# Patient Record
Sex: MALE | Race: WHITE | NOT HISPANIC OR LATINO | URBAN - METROPOLITAN AREA
[De-identification: names, ages, dates, MRNs, and addresses within clinical notes are randomized per-mention and may not be internally consistent; named-entity substitution may affect disease eponyms.]

---
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
## 2025-01-29 ENCOUNTER — TELEPHONE (OUTPATIENT)
Dept: OTHER | Age: 20
End: 2025-01-29
Payer: COMMERCIAL

## 2025-01-29 DIAGNOSIS — F41.9 ANXIETY DISORDER, UNSPECIFIED TYPE: ICD-10-CM

## 2025-01-29 RX ORDER — ESCITALOPRAM OXALATE 10 MG/1
10 TABLET ORAL EVERY MORNING
Qty: 30 TABLET | Refills: 0 | Status: SHIPPED | OUTPATIENT
Start: 2025-01-29 | End: 2025-02-28

## 2025-01-29 NOTE — TELEPHONE ENCOUNTER
Caller: pt's dadJohn    Medication:  Escitalopram 10 mgs    Pharmacy:  [place] [phone]    Next visit: 2.21.25

## 2025-02-21 ENCOUNTER — TELEPHONE (OUTPATIENT)
Dept: BEHAVIORAL HEALTH | Facility: CLINIC | Age: 20
End: 2025-02-21

## 2025-02-21 ENCOUNTER — APPOINTMENT (OUTPATIENT)
Dept: BEHAVIORAL HEALTH | Facility: CLINIC | Age: 20
End: 2025-02-21
Payer: COMMERCIAL

## 2025-02-21 DIAGNOSIS — F90.2 ADHD (ATTENTION DEFICIT HYPERACTIVITY DISORDER), COMBINED TYPE: Primary | ICD-10-CM

## 2025-02-21 DIAGNOSIS — F41.9 ANXIETY DISORDER, UNSPECIFIED TYPE: ICD-10-CM

## 2025-02-21 PROCEDURE — 99213 OFFICE O/P EST LOW 20 MIN: CPT | Performed by: PSYCHIATRY & NEUROLOGY

## 2025-02-21 RX ORDER — SERDEXMETHYLPHENIDATE AND DEXMETHYLPHENIDATE 5.2; 26.1 MG/1; MG/1
1 CAPSULE ORAL DAILY
Qty: 30 CAPSULE | Refills: 0 | Status: SHIPPED | OUTPATIENT
Start: 2025-02-21 | End: 2025-03-23

## 2025-02-21 RX ORDER — SERDEXMETHYLPHENIDATE AND DEXMETHYLPHENIDATE 5.2; 26.1 MG/1; MG/1
1 CAPSULE ORAL DAILY
Qty: 30 CAPSULE | Refills: 0 | Status: SHIPPED | OUTPATIENT
Start: 2025-02-21 | End: 2025-02-21

## 2025-02-21 RX ORDER — ESCITALOPRAM OXALATE 10 MG/1
10 TABLET ORAL EVERY MORNING
Qty: 90 TABLET | Refills: 1 | Status: SHIPPED | OUTPATIENT
Start: 2025-02-21 | End: 2025-08-20

## 2025-02-21 NOTE — PROGRESS NOTES
Virtual appointment with patient.  Consent obtained for this platform and identification verified.  He was located in [place] where he attends college.      He states freshman year at [place] is going well.  Earning A and B grades.  Socially doing well.      He has been taking Escitalopram 10 mg consistently which he states helps with mood/anxiety. He denies adverse effects.    He denies any sustained sadness or heightened anxiety.   He denies suicidal or homicidal ideation  No aggression or self-harm  He denies physical complaints.     After last appointment 8/2024 he started Azstarys 26.1-5.2 mg which he reports helped with ADHD more than other medications he has used. He filled second Rx in November, then most recently he filled Vyvanse 20 mg which was on file at pharmacy from past trial.  He reviewed Azstarys works the best for ADHD    He reports infrequent cannabis use, aware of risks    MSE:  Normal dress and grooming. Thought process goal-directed.  Speech normal tone, rate, quality.  Euthymic mood, full range of affect.  Denies suicidal or homicidal ideation.  No agitation.  Alert and oriented X 4.   Judgment and insight fair    Dx:  ADHD;  Anxiety Disorder    Plan:    Resume Azstarys 26.1-5.2 mg, one every morning.  Consent obtained.  Rx for #30 sent to [place] pharmacy    OARRS reviewed    Continue Escitalopram 10 mg every morning.  Ongoing consent obtained.  Rx for 10 mg #90 with one refill sent to [place] pharmacy    Follow-up in 3 months, call as needed in the interim

## 2025-02-23 ENCOUNTER — OFFICE VISIT (OUTPATIENT)
Dept: URGENT CARE | Age: 20
End: 2025-02-23
Payer: COMMERCIAL

## 2025-02-23 VITALS
OXYGEN SATURATION: 98 % | SYSTOLIC BLOOD PRESSURE: 122 MMHG | DIASTOLIC BLOOD PRESSURE: 68 MMHG | RESPIRATION RATE: 16 BRPM | HEART RATE: 110 BPM | TEMPERATURE: 98.1 F

## 2025-02-23 DIAGNOSIS — R68.89 FLU-LIKE SYMPTOMS: ICD-10-CM

## 2025-02-23 DIAGNOSIS — J06.9 VIRAL URI: Primary | ICD-10-CM

## 2025-02-23 DIAGNOSIS — Z20.822 SUSPECTED COVID-19 VIRUS INFECTION: ICD-10-CM

## 2025-02-23 LAB
POC RAPID INFLUENZA A: NEGATIVE
POC RAPID INFLUENZA B: NEGATIVE
POC SARS-COV-2 AG BINAX: NORMAL

## 2025-02-23 PROCEDURE — 87804 INFLUENZA ASSAY W/OPTIC: CPT | Performed by: PHYSICIAN ASSISTANT

## 2025-02-23 PROCEDURE — 99203 OFFICE O/P NEW LOW 30 MIN: CPT | Performed by: PHYSICIAN ASSISTANT

## 2025-02-23 PROCEDURE — 87811 SARS-COV-2 COVID19 W/OPTIC: CPT | Performed by: PHYSICIAN ASSISTANT

## 2025-02-23 RX ORDER — BROMPHENIRAMINE MALEATE, PSEUDOEPHEDRINE HYDROCHLORIDE, AND DEXTROMETHORPHAN HYDROBROMIDE 2; 30; 10 MG/5ML; MG/5ML; MG/5ML
5 SYRUP ORAL 4 TIMES DAILY PRN
Qty: 120 ML | Refills: 0 | Status: SHIPPED | OUTPATIENT
Start: 2025-02-23 | End: 2025-03-05

## 2025-02-23 ASSESSMENT — ENCOUNTER SYMPTOMS
WHEEZING: 0
SHORTNESS OF BREATH: 0
CHILLS: 0
RHINORRHEA: 1
EYE DISCHARGE: 0
CHEST TIGHTNESS: 0
EYE REDNESS: 0
COUGH: 1
SORE THROAT: 1
FEVER: 1

## 2025-02-23 NOTE — PROGRESS NOTES
Subjective   Patient ID: Jim Jeff is a 19 y.o. male. They present today with a chief complaint of Cough (3 days cough runny nose congestion).    History of Present Illness  Patient presents for illness symptoms x 3 days. He admits to nasal congestion, runny nose, cough, fever and sore throat. He denies ear pain, headaches, body aches, SOB, chest tightness, wheezing. He has no history of asthma. He has tried OTC Tylenol severe cold and Mucinex.          Past Medical History  Allergies as of 02/23/2025    (No Known Allergies)       (Not in a hospital admission)       Past Medical History:   Diagnosis Date    Vitamin D deficiency 05/16/2023       No past surgical history on file.         Review of Systems  Review of Systems   Constitutional:  Positive for fever. Negative for chills.   HENT:  Positive for congestion, rhinorrhea and sore throat. Negative for ear pain.    Eyes:  Negative for discharge and redness.   Respiratory:  Positive for cough. Negative for chest tightness, shortness of breath and wheezing.                                   Objective    Vitals:    02/23/25 1413   BP: 122/68   Pulse: 110   Resp: 16   Temp: 36.7 °C (98.1 °F)   SpO2: 98%     No LMP for male patient.    Physical Exam  Vitals reviewed.   Constitutional:       General: He is not in acute distress.     Appearance: Normal appearance. He is ill-appearing.   HENT:      Right Ear: Tympanic membrane, ear canal and external ear normal.      Left Ear: Tympanic membrane, ear canal and external ear normal.      Nose: Congestion and rhinorrhea present.      Mouth/Throat:      Pharynx: Postnasal drip present. No pharyngeal swelling, oropharyngeal exudate or posterior oropharyngeal erythema.      Tonsils: No tonsillar exudate or tonsillar abscesses.   Cardiovascular:      Rate and Rhythm: Normal rate and regular rhythm.      Pulses: Normal pulses.      Heart sounds: Normal heart sounds.   Pulmonary:      Effort: Pulmonary effort is normal. No  respiratory distress.      Breath sounds: Normal breath sounds. No wheezing, rhonchi or rales.   Lymphadenopathy:      Cervical: Cervical adenopathy present.   Neurological:      Mental Status: He is alert and oriented to person, place, and time.         Procedures    Point of Care Test & Imaging Results from this visit  Results for orders placed or performed in visit on 02/23/25   POCT Influenza A/B manually resulted   Result Value Ref Range    POC Rapid Influenza A Negative Negative    POC Rapid Influenza B Negative Negative   POCT BinaxNOW Covid-19 Ag Card manually resulted   Result Value Ref Range    POC CHASITY-COV-2 AG  Presumptive negative test for SARS-CoV-2 (no antigen detected)     Presumptive negative test for SARS-CoV-2 (no antigen detected)      No results found.    Diagnostic study results (if any) were reviewed by Maira Kang PA-C.    Assessment/Plan   Allergies, medications, history, and pertinent labs/EKGs/Imaging reviewed by Maira Kang PA-C.     Medical Decision Making  MDM: History and examination consistent with viral URI. Rapid COVID and influenza are negative. Reassured there are no signs of pneumonia, sinusitis, otitis or other bacterial etiology. Plan at this time is supportive measures and symptomatic care at home. Advised to go to ER if worsens, otherwise follow with pcp. Patient verbalized understanding and agrees with plan.      Orders and Diagnoses  Diagnoses and all orders for this visit:  Viral URI  -     brompheniramine-pseudoeph-DM 2-30-10 mg/5 mL syrup; Take 5 mL by mouth 4 times a day as needed for allergies for up to 10 days.  Flu-like symptoms  -     POCT Influenza A/B manually resulted  Suspected COVID-19 virus infection  -     POCT BinaxNOW Covid-19 Ag Card manually resulted      Medical Admin Record      Patient disposition: Home    Electronically signed by Maira Kang PA-C  2:27 PM

## 2025-02-23 NOTE — LETTER
February 23, 2025     Patient: Jim Jeff   YOB: 2005   Date of Visit: 2/23/2025       To Whom It May Concern:    Jim Jeff was seen in my clinic on 2/23/2025 at 2:20 pm. Please excuse Jim for his absence from school from 2/24-2/25/25.    If you have any questions or concerns, please don't hesitate to call.         Sincerely,         Maira Kang PA-C        C

## 2025-06-12 ENCOUNTER — OFFICE VISIT (OUTPATIENT)
Dept: PEDIATRICS | Facility: CLINIC | Age: 20
End: 2025-06-12
Payer: COMMERCIAL

## 2025-06-12 VITALS — BODY MASS INDEX: 27.38 KG/M2 | WEIGHT: 191.25 LBS | TEMPERATURE: 98 F | HEIGHT: 70 IN

## 2025-06-12 DIAGNOSIS — R30.0 DYSURIA: Primary | ICD-10-CM

## 2025-06-12 DIAGNOSIS — Z13.9 SCREENING PROCEDURE: ICD-10-CM

## 2025-06-12 LAB
BILIRUBIN, POC: NEGATIVE
BLOOD URINE, POC: POSITIVE
CLARITY, POC: CLEAR
COLOR, POC: YELLOW
GLUCOSE URINE, POC: NEGATIVE
KETONES, POC: NEGATIVE
LEUKOCYTE EST, POC: NEGATIVE
NITRITE, POC: NEGATIVE
PH, POC: 8
POC APPEARANCE OF BODY FLUID: CLEAR
SPECIFIC GRAVITY, POC: 1.01
URINE PROTEIN, POC: NEGATIVE
UROBILINOGEN, POC: 0.2

## 2025-06-12 PROCEDURE — 99213 OFFICE O/P EST LOW 20 MIN: CPT | Performed by: PEDIATRICS

## 2025-06-12 PROCEDURE — 3008F BODY MASS INDEX DOCD: CPT | Performed by: PEDIATRICS

## 2025-06-12 PROCEDURE — 81002 URINALYSIS NONAUTO W/O SCOPE: CPT | Performed by: PEDIATRICS

## 2025-06-12 RX ORDER — DOXYCYCLINE 100 MG/1
100 CAPSULE ORAL 2 TIMES DAILY
Qty: 14 CAPSULE | Refills: 0 | Status: SHIPPED | OUTPATIENT
Start: 2025-06-12 | End: 2025-06-19

## 2025-06-12 NOTE — PROGRESS NOTES
"Subjective   Jim Jeff is a 19 y.o. male who presents for OTHER (Pt here with dad troubling urinating ).  Today he is accompanied by caregiver who is also providing history.    Her due to 2-3 d history of dysuria and white discharge.  No fever, GI symptoms, blood in urine.  +sexually active.  Infrequent condom use.         Objective     Temp 36.7 °C (98 °F) (Tympanic)   Ht 1.772 m (5' 9.75\")   Wt 86.8 kg (191 lb 4 oz)   BMI 27.64 kg/m²     Physical Exam  Vitals reviewed.   Constitutional:       Appearance: Normal appearance.   HENT:      Right Ear: Tympanic membrane normal.      Left Ear: Tympanic membrane normal.      Nose: Nose normal.      Mouth/Throat:      Mouth: Mucous membranes are moist.   Eyes:      Conjunctiva/sclera: Conjunctivae normal.   Cardiovascular:      Rate and Rhythm: Normal rate and regular rhythm.      Heart sounds: Normal heart sounds.   Pulmonary:      Effort: Pulmonary effort is normal.      Breath sounds: Normal breath sounds.   Abdominal:      General: Abdomen is flat.      Palpations: Abdomen is soft. There is no mass.   Genitourinary:     Penis: Circumcised. Discharge (white) present.    Musculoskeletal:      Cervical back: Normal range of motion.   Skin:     General: Skin is warm.      Findings: No rash.   Neurological:      Mental Status: He is alert and oriented to person, place, and time.   Psychiatric:         Mood and Affect: Mood normal.         Assessment/Plan   Jim was seen today for other.  Diagnoses and all orders for this visit:  Dysuria (Primary)  -     doxycycline (Vibramycin) 100 mg capsule; Take 1 capsule (100 mg) by mouth 2 times a day for 7 days. Take with at least 8 ounces (large glass) of water, do not lie down for 30 minutes after  -     POCT urinalysis dipstick  Screening procedure  -     C. trachomatis / N. gonorrhoeae, Amplified, Urogenital  I presume this is chlamydia.  Treatment will be with doxycyline.  Refrain from sexual activity for 7 days.  Partner " needs to be treated as well.  High risk individuals will require retesting in a month but everyone else should be retested in 3 months.  Condom used is encouraged.

## 2025-06-13 LAB
C TRACH RRNA SPEC QL NAA+PROBE: NOT DETECTED
N GONORRHOEA RRNA SPEC QL NAA+PROBE: DETECTED
QUEST GC CT AMPLIFIED (ALWAYS MESSAGE): ABNORMAL

## 2025-06-14 ENCOUNTER — TELEPHONE (OUTPATIENT)
Dept: PEDIATRICS | Facility: CLINIC | Age: 20
End: 2025-06-14
Payer: COMMERCIAL

## 2025-06-14 NOTE — TELEPHONE ENCOUNTER
Spoke with pt about gonorrhea results.  Advised apt on monday for shot of antibiotic (ceftriaxone).  Pt in agreement.

## 2025-06-16 ENCOUNTER — OFFICE VISIT (OUTPATIENT)
Dept: PEDIATRICS | Facility: CLINIC | Age: 20
End: 2025-06-16
Payer: COMMERCIAL

## 2025-06-16 VITALS — BODY MASS INDEX: 28.06 KG/M2 | TEMPERATURE: 96.2 F | WEIGHT: 194.2 LBS

## 2025-06-16 DIAGNOSIS — Z11.4 SCREENING FOR HIV (HUMAN IMMUNODEFICIENCY VIRUS): ICD-10-CM

## 2025-06-16 DIAGNOSIS — A54.9 GONORRHEA IN MALE: Primary | ICD-10-CM

## 2025-06-16 DIAGNOSIS — Z72.51 HIGH RISK SEXUAL BEHAVIOR, UNSPECIFIED TYPE: ICD-10-CM

## 2025-06-16 DIAGNOSIS — Z11.59 NEED FOR HEPATITIS C SCREENING TEST: ICD-10-CM
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
## 2025-06-16 PROBLEM — S61.519A SELF-CUTTING OF WRIST (MULTI): Status: ACTIVE | Noted: 2025-06-16

## 2025-06-16 PROCEDURE — 99213 OFFICE O/P EST LOW 20 MIN: CPT | Performed by: PEDIATRICS

## 2025-06-16 PROCEDURE — 96372 THER/PROPH/DIAG INJ SC/IM: CPT | Performed by: PEDIATRICS

## 2025-06-16 RX ORDER — CEFTRIAXONE 500 MG/1
500 INJECTION, POWDER, FOR SOLUTION INTRAMUSCULAR; INTRAVENOUS ONCE
Status: COMPLETED | OUTPATIENT
Start: 2025-06-16 | End: 2025-06-16

## 2025-06-16 RX ADMIN — CEFTRIAXONE 500 MG: 500 INJECTION, POWDER, FOR SOLUTION INTRAMUSCULAR; INTRAVENOUS at 11:58

## 2025-06-16 NOTE — PROGRESS NOTES
Subjective   Patient ID: Jim Jeff is a 19 y.o. male who presents for Other (Ceft. Shot/Here by himself).  - pt here for  IM for +GC test few d ago  - neg for Chlam  - here for dysuria and white discharge   - no F/abd pain/phar/arthritis  - pt prefers M and F       Review of Systems  Temperature 35.7 °C (96.2 °F), temperature source Tympanic, weight 88.1 kg (194 lb 3.2 oz).   Objective   Physical Exam  Constitutional:       Appearance: Normal appearance.   Cardiovascular:      Rate and Rhythm: Normal rate and regular rhythm.      Heart sounds: Normal heart sounds.   Pulmonary:      Effort: Pulmonary effort is normal.      Breath sounds: Normal breath sounds.   Abdominal:      General: There is no distension.      Palpations: Abdomen is soft.      Tenderness: There is no abdominal tenderness.   Genitourinary:     Testes: Normal.      Comments: No lesions but milky white discharge at tip of penis  Neurological:      Mental Status: He is alert.         Assessment/Plan   19 y.o. male here for tx for +GC  Test sent for HIV/syph/HepC  Disc partner tx's   Pt will consider  PreP clinic (2-286-PREP)  Disc CT/GC retest in 3mos  Discussed all of this in the context of total patient care in their medical home with us.

## 2025-06-17 PROBLEM — Z72.89 DELIBERATE SELF-CUTTING: Status: RESOLVED | Noted: 2023-07-18 | Resolved: 2025-06-17

## 2025-06-17 PROBLEM — Z86.16 HISTORY OF SEVERE ACUTE RESPIRATORY SYNDROME CORONAVIRUS 2 (SARS-COV-2) DISEASE: Status: RESOLVED | Noted: 2021-06-11 | Resolved: 2025-06-17

## 2025-06-17 PROBLEM — S92.301A MULTIPLE CLOSED FRACTURES OF METATARSAL BONE OF RIGHT FOOT: Status: RESOLVED | Noted: 2023-09-27 | Resolved: 2025-06-17

## 2025-06-17 NOTE — PROGRESS NOTES
"Subjective   History was provided by the pt.  Jim HENDERSON First is a 19 y.o. male who is here for this well visit.  - Emili#2 + h+v + lipids    Current Issues:  Current concerns include none  Gonorrhea in male  - no longer discharge / dysuria  - no F/abd pains/ST/arthr  - knows to f/u urine test in 2mos    MIS-C associated with COVID-19  - no longer    Deliberate self-cutting  - last SI/HI:  3+wks ago  - recommended counseling check=in  - PHQ9:  ok    Cannabis abuse  - current usage:  5/7 days per wk - smoke   - EtOH 2-3x/wk, 10 drinks per episode - sometimes by self or w/ family - discussed this - not driving  - estefania vap and pouches daily   - disc quit lines and gave websites - disc this 11m    Attention-deficit/hyperactivity disorder  - sees [facility] for Adzenys - 3mo f/u due last month    - doesn't really take this b/c not in school      Anxiety  - Lexapro 10 per [facility]  - takes every day  - has a counselor prn - disc seeing them now    Celiac disease ([facility])  - avoids gluten well  - no GI issues o/w  - f/u GI needed now    Sleep: all night  Dental:  brushes teeth 2x/d - sees dentist  No issues using restroom  Testicular self-exams discussed    Review of Nutrition:  Current diet: adequate milk/Vit D source   Adequate fruit/vegetable intake    Social Screening:   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     Objective   /80   Pulse 76   Ht 1.785 m (5' 10.28\")   Wt 85.2 kg (187 lb 14.4 oz)   BMI 26.75 kg/m²   Physical Exam  Constitutional:       Appearance: Normal appearance.   HENT:      Right Ear: Tympanic " membrane normal.      Left Ear: Tympanic membrane normal.      Nose: Nose normal.      Mouth/Throat:      Mouth: Mucous membranes are moist.      Pharynx: Oropharynx is clear.   Eyes:      Extraocular Movements: Extraocular movements intact.   Cardiovascular:      Rate and Rhythm: Normal rate and regular rhythm.      Pulses:           Radial pulses are 2+ on the right side and 2+ on the left side.      Heart sounds: No murmur heard.  Pulmonary:      Effort: Pulmonary effort is normal.      Breath sounds: Normal breath sounds.   Abdominal:      General: Abdomen is flat.      Palpations: Abdomen is soft. There is no hepatomegaly, splenomegaly or mass.   Genitourinary:     Penis: Normal.       Testes: Normal.         Right: Testicular hydrocele not present.         Left: Testicular hydrocele not present.      Osvaldo stage (genital): 5.   Musculoskeletal:         General: Normal range of motion.      Cervical back: Normal range of motion and neck supple.      Thoracic back: No scoliosis.      Lumbar back: No scoliosis.   Lymphadenopathy:      Cervical: No cervical adenopathy.   Skin:     General: Skin is warm and dry.   Neurological:      General: No focal deficit present.      Mental Status: He is alert.      Deep Tendon Reflexes:      Reflex Scores:       Patellar reflexes are 2+ on the right side and 2+ on the left side.  Psychiatric:         Mood and Affect: Mood normal.         Behavior: Behavior normal.         Assessment/Plan   19 y.o. male w/ NL G+D  1. Anticipatory guidance discussed.   2. Cleared for school/sports  3. Vaccine, if given, discussed and consented.  4. Follow up in 1 year for next well child exam or sooner with concerns.

## 2025-06-17 NOTE — ASSESSMENT & PLAN NOTE
- current usage:  5/7 days per wk - smoke   - EtOH 2-3x/wk, 10 drinks per episode - sometimes by self or w/ family - discussed this - not driving  - estefania vap and pouches daily   - disc quit lines and gave websites - disc this 11m

## 2025-06-17 NOTE — ASSESSMENT & PLAN NOTE
- sees [facility]al for Adzenys - 3mo f/u due last month    - doesn't really take this b/c not in school

## 2025-06-23 ENCOUNTER — APPOINTMENT (OUTPATIENT)
Dept: PEDIATRICS | Facility: CLINIC | Age: 20
End: 2025-06-23
Payer: COMMERCIAL

## 2025-06-23 VITALS
HEIGHT: 70 IN | HEART RATE: 76 BPM | SYSTOLIC BLOOD PRESSURE: 126 MMHG | BODY MASS INDEX: 26.9 KG/M2 | WEIGHT: 187.9 LBS | DIASTOLIC BLOOD PRESSURE: 80 MMHG

## 2025-06-23 DIAGNOSIS — K90.0 CELIAC DISEASE (HHS-HCC): ICD-10-CM

## 2025-06-23 DIAGNOSIS — F41.9 ANXIETY: ICD-10-CM

## 2025-06-23 DIAGNOSIS — Z23 NEED FOR VACCINATION: ICD-10-CM

## 2025-06-23 DIAGNOSIS — F12.10 CANNABIS ABUSE: ICD-10-CM

## 2025-06-23 DIAGNOSIS — A54.9 GONORRHEA IN MALE: ICD-10-CM

## 2025-06-23 DIAGNOSIS — Z13.220 LIPID SCREENING: ICD-10-CM

## 2025-06-23 DIAGNOSIS — F90.0 ATTENTION DEFICIT HYPERACTIVITY DISORDER (ADHD), PREDOMINANTLY INATTENTIVE TYPE: ICD-10-CM

## 2025-06-23 DIAGNOSIS — F17.209 NICOTINE DEPENDENCE WITH NICOTINE-INDUCED DISORDER, UNSPECIFIED NICOTINE PRODUCT TYPE: ICD-10-CM

## 2025-06-23 DIAGNOSIS — Z00.00 WELL ADULT EXAM: Primary | ICD-10-CM

## 2025-06-23 PROBLEM — M35.81 MIS-C ASSOCIATED WITH COVID-19: Status: RESOLVED | Noted: 2023-05-16 | Resolved: 2025-06-23

## 2025-06-23 PROCEDURE — 3008F BODY MASS INDEX DOCD: CPT | Performed by: PEDIATRICS

## 2025-06-23 PROCEDURE — 99395 PREV VISIT EST AGE 18-39: CPT | Performed by: PEDIATRICS

## 2025-06-23 PROCEDURE — 90471 IMMUNIZATION ADMIN: CPT | Performed by: PEDIATRICS

## 2025-06-23 PROCEDURE — 96127 BRIEF EMOTIONAL/BEHAV ASSMT: CPT | Performed by: PEDIATRICS

## 2025-06-23 PROCEDURE — 99177 OCULAR INSTRUMNT SCREEN BIL: CPT | Performed by: PEDIATRICS

## 2025-06-23 PROCEDURE — 92552 PURE TONE AUDIOMETRY AIR: CPT | Performed by: PEDIATRICS

## 2025-06-23 PROCEDURE — 90620 MENB-4C VACCINE IM: CPT | Performed by: PEDIATRICS

## 2025-06-23 PROCEDURE — 99407 BEHAV CHNG SMOKING > 10 MIN: CPT | Performed by: PEDIATRICS

## 2025-06-23 ASSESSMENT — PATIENT HEALTH QUESTIONNAIRE - PHQ9
2. FEELING DOWN, DEPRESSED OR HOPELESS: SEVERAL DAYS
4. FEELING TIRED OR HAVING LITTLE ENERGY: NOT AT ALL
10. IF YOU CHECKED OFF ANY PROBLEMS, HOW DIFFICULT HAVE THESE PROBLEMS MADE IT FOR YOU TO DO YOUR WORK, TAKE CARE OF THINGS AT HOME, OR GET ALONG WITH OTHER PEOPLE: SOMEWHAT DIFFICULT
8. MOVING OR SPEAKING SO SLOWLY THAT OTHER PEOPLE COULD HAVE NOTICED. OR THE OPPOSITE - BEING SO FIDGETY OR RESTLESS THAT YOU HAVE BEEN MOVING AROUND A LOT MORE THAN USUAL: NOT AT ALL
3. TROUBLE FALLING OR STAYING ASLEEP OR SLEEPING TOO MUCH: NOT AT ALL
SUM OF ALL RESPONSES TO PHQ QUESTIONS 1-9: 4
7. TROUBLE CONCENTRATING ON THINGS, SUCH AS READING THE NEWSPAPER OR WATCHING TELEVISION: NOT AT ALL
10. IF YOU CHECKED OFF ANY PROBLEMS, HOW DIFFICULT HAVE THESE PROBLEMS MADE IT FOR YOU TO DO YOUR WORK, TAKE CARE OF THINGS AT HOME, OR GET ALONG WITH OTHER PEOPLE: SOMEWHAT DIFFICULT
9. THOUGHTS THAT YOU WOULD BE BETTER OFF DEAD, OR OF HURTING YOURSELF: NOT AT ALL
6. FEELING BAD ABOUT YOURSELF - OR THAT YOU ARE A FAILURE OR HAVE LET YOURSELF OR YOUR FAMILY DOWN: MORE THAN HALF THE DAYS
7. TROUBLE CONCENTRATING ON THINGS, SUCH AS READING THE NEWSPAPER OR WATCHING TELEVISION: NOT AT ALL
5. POOR APPETITE OR OVEREATING: SEVERAL DAYS
5. POOR APPETITE OR OVEREATING: SEVERAL DAYS
8. MOVING OR SPEAKING SO SLOWLY THAT OTHER PEOPLE COULD HAVE NOTICED. OR THE OPPOSITE, BEING SO FIGETY OR RESTLESS THAT YOU HAVE BEEN MOVING AROUND A LOT MORE THAN USUAL: NOT AT ALL
2. FEELING DOWN, DEPRESSED OR HOPELESS: SEVERAL DAYS
SUM OF ALL RESPONSES TO PHQ9 QUESTIONS 1 & 2: 1
1. LITTLE INTEREST OR PLEASURE IN DOING THINGS: NOT AT ALL
4. FEELING TIRED OR HAVING LITTLE ENERGY: NOT AT ALL
1. LITTLE INTEREST OR PLEASURE IN DOING THINGS: NOT AT ALL
6. FEELING BAD ABOUT YOURSELF - OR THAT YOU ARE A FAILURE OR HAVE LET YOURSELF OR YOUR FAMILY DOWN: MORE THAN HALF THE DAYS
3. TROUBLE FALLING OR STAYING ASLEEP: NOT AT ALL
9. THOUGHTS THAT YOU WOULD BE BETTER OFF DEAD, OR OF HURTING YOURSELF: NOT AT ALL

## 2025-06-23 ASSESSMENT — ANXIETY QUESTIONNAIRES
3. WORRYING TOO MUCH ABOUT DIFFERENT THINGS: SEVERAL DAYS
1. FEELING NERVOUS, ANXIOUS, OR ON EDGE: SEVERAL DAYS
4. TROUBLE RELAXING: NOT AT ALL
5. BEING SO RESTLESS THAT IT IS HARD TO SIT STILL: NOT AT ALL
2. NOT BEING ABLE TO STOP OR CONTROL WORRYING: NOT AT ALL
IF YOU CHECKED OFF ANY PROBLEMS ON THIS QUESTIONNAIRE, HOW DIFFICULT HAVE THESE PROBLEMS MADE IT FOR YOU TO DO YOUR WORK, TAKE CARE OF THINGS AT HOME, OR GET ALONG WITH OTHER PEOPLE: SOMEWHAT DIFFICULT
3. WORRYING TOO MUCH ABOUT DIFFERENT THINGS: SEVERAL DAYS
7. FEELING AFRAID AS IF SOMETHING AWFUL MIGHT HAPPEN: NOT AT ALL
1. FEELING NERVOUS, ANXIOUS, OR ON EDGE: SEVERAL DAYS
GAD7 TOTAL SCORE: 3
5. BEING SO RESTLESS THAT IT IS HARD TO SIT STILL: NOT AT ALL
4. TROUBLE RELAXING: NOT AT ALL
IF YOU CHECKED OFF ANY PROBLEMS ON THIS QUESTIONNAIRE, HOW DIFFICULT HAVE THESE PROBLEMS MADE IT FOR YOU TO DO YOUR WORK, TAKE CARE OF THINGS AT HOME, OR GET ALONG WITH OTHER PEOPLE: SOMEWHAT DIFFICULT
6. BECOMING EASILY ANNOYED OR IRRITABLE: SEVERAL DAYS
2. NOT BEING ABLE TO STOP OR CONTROL WORRYING: NOT AT ALL
6. BECOMING EASILY ANNOYED OR IRRITABLE: SEVERAL DAYS
7. FEELING AFRAID AS IF SOMETHING AWFUL MIGHT HAPPEN: NOT AT ALL

## 2025-07-28 ENCOUNTER — APPOINTMENT (OUTPATIENT)
Dept: BEHAVIORAL HEALTH | Facility: CLINIC | Age: 20
End: 2025-07-28
Payer: COMMERCIAL

## 2025-07-28 VITALS
WEIGHT: 186 LBS | HEIGHT: 70 IN | TEMPERATURE: 97.8 F | HEART RATE: 92 BPM | RESPIRATION RATE: 16 BRPM | DIASTOLIC BLOOD PRESSURE: 73 MMHG | SYSTOLIC BLOOD PRESSURE: 120 MMHG | BODY MASS INDEX: 26.63 KG/M2

## 2025-07-28 DIAGNOSIS — F41.9 ANXIETY DISORDER, UNSPECIFIED TYPE: ICD-10-CM

## 2025-07-28 DIAGNOSIS — F90.2 ADHD (ATTENTION DEFICIT HYPERACTIVITY DISORDER), COMBINED TYPE: Primary | ICD-10-CM

## 2025-07-28 PROCEDURE — 99214 OFFICE O/P EST MOD 30 MIN: CPT | Performed by: PSYCHIATRY & NEUROLOGY

## 2025-07-28 PROCEDURE — 3008F BODY MASS INDEX DOCD: CPT | Performed by: PSYCHIATRY & NEUROLOGY

## 2025-07-28 RX ORDER — SERDEXMETHYLPHENIDATE AND DEXMETHYLPHENIDATE 7.8; 39.2 MG/1; MG/1
1 CAPSULE ORAL DAILY
Qty: 30 CAPSULE | Refills: 0 | Status: SHIPPED | OUTPATIENT
Start: 2025-07-28 | End: 2025-08-27

## 2025-07-28 RX ORDER — ESCITALOPRAM OXALATE 10 MG/1
10 TABLET ORAL EVERY MORNING
Qty: 30 TABLET | Refills: 3 | Status: SHIPPED | OUTPATIENT
Start: 2025-07-28 | End: 2025-11-25

## 2025-07-28 ASSESSMENT — PAIN SCALES - GENERAL: PAINLEVEL_OUTOF10: 0-NO PAIN

## 2025-07-29 NOTE — PROGRESS NOTES
In-person appointment with patient and father, seen together and patient seen individually.      Patient states his mood has been good with Escitalopram 10 mg    He states Azstarys 26.1-5.2 mg not helping with ADHD as much as it used to.    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      He earned 3.0 GPA at [employer].  He is going to take a gap year this upcoming year before planning to return to [place] to complete college with journalism major.    He is going to move out of house and live with brother soon.     Typically sleeps through the night.  Appetite good    Denies physical complaints.      No aggression or self-harm.      He reports cannabis use most nights, aware of risks.      Vitals: /73, HR 92, resp 16, temp 97.8, weight 186 lbs    MSE:  Normal dress and grooming.  Thought process goal-directed.  Speech normal tone, rate, quality.  Euthymic mood, full range of affect.  Denies suicidal or homicidal ideation.  Future-oriented.  Judgment and insight fair    Dx:  ADHD;  Anxiety Disorder    Plan:    Increase Azstarys to 39.2-7.8 mg.  Consent obtained.  Rx for #30 sent to "[facility]"  Controlled Substance Agreement Form completed  Urine drug screen ordered.      Continue Escitalopram 10 mg every morning.  Ongoing consent obtained.  Rx for 10 mg #30 with 3 refills sent to "[facility]"    Update 2-3 weeks, follow-up 3 months

## 2025-08-07 ENCOUNTER — OFFICE VISIT (OUTPATIENT)
Dept: PEDIATRICS | Facility: CLINIC | Age: 20
End: 2025-08-07
Payer: COMMERCIAL

## 2025-08-07 VITALS — TEMPERATURE: 97.3 F | WEIGHT: 181.38 LBS | HEIGHT: 70 IN | BODY MASS INDEX: 25.97 KG/M2

## 2025-08-07 DIAGNOSIS — J02.9 SORE THROAT: ICD-10-CM

## 2025-08-07 LAB — POC RAPID STREP: NEGATIVE

## 2025-08-07 PROCEDURE — 87880 STREP A ASSAY W/OPTIC: CPT | Performed by: PEDIATRICS

## 2025-08-07 PROCEDURE — 99213 OFFICE O/P EST LOW 20 MIN: CPT | Performed by: PEDIATRICS

## 2025-08-07 PROCEDURE — 3008F BODY MASS INDEX DOCD: CPT | Performed by: PEDIATRICS

## 2025-08-07 RX ORDER — NAPROXEN 500 MG/1
500 TABLET ORAL 2 TIMES DAILY
Qty: 14 TABLET | Refills: 0 | Status: SHIPPED | OUTPATIENT
Start: 2025-08-07 | End: 2025-08-14

## 2025-08-07 NOTE — PROGRESS NOTES
"Subjective   Jim Jeff is a 19 y.o. male who presents for Sore Throat (Pt here with mom Evangelina Jeff).  Today he is accompanied by alone.     HPI  3 days ST, 2 days fever. Runny nose, post nasal drip. No known sick contacts. Works at a restaurant.    Objective   Temp 36.3 °C (97.3 °F) (Tympanic)   Ht 1.778 m (5' 10\")   Wt 82.3 kg (181 lb 6 oz)   BMI 26.02 kg/m²     Growth percentiles: 56 %ile (Z= 0.14) based on CDC (Boys, 2-20 Years) Stature-for-age data based on Stature recorded on 8/7/2025. 82 %ile (Z= 0.91) based on CDC (Boys, 2-20 Years) weight-for-age data using data from 8/7/2025.     Physical Exam  Alert in NAD  Tms clear  Post OP erythema, 2+ red tonsils  Shotty b/l ant cerv LAD  RRR S1S2  CTAB  Abd soft NTND    Assessment/Plan   Diagnoses and all orders for this visit:  Sore throat  -     POCT rapid strep A  -     naproxen (Naprosyn) 500 mg tablet; Take 1 tablet (500 mg) by mouth 2 times a day for 7 days.  -     Group A Streptococcus, PCR        No better in 3-4 days, would consider blood work for mono  "

## 2025-08-08 LAB — S PYO DNA THROAT QL NAA+PROBE: NOT DETECTED

## 2025-08-13 DIAGNOSIS — J02.9 SORE THROAT: ICD-10-CM

## 2025-08-17 DIAGNOSIS — J02.9 SORE THROAT: ICD-10-CM

## 2025-08-18 RX ORDER — NAPROXEN 500 MG/1
500 TABLET ORAL 2 TIMES DAILY
Qty: 14 TABLET | Refills: 0 | OUTPATIENT
Start: 2025-08-18 | End: 2025-08-25

## 2025-08-21 RX ORDER — NAPROXEN 500 MG/1
500 TABLET ORAL 2 TIMES DAILY
Qty: 14 TABLET | Refills: 0 | Status: SHIPPED | OUTPATIENT
Start: 2025-08-21 | End: 2025-08-28